# Patient Record
Sex: FEMALE | Race: WHITE | HISPANIC OR LATINO | Employment: FULL TIME | URBAN - METROPOLITAN AREA
[De-identification: names, ages, dates, MRNs, and addresses within clinical notes are randomized per-mention and may not be internally consistent; named-entity substitution may affect disease eponyms.]

---

## 2017-12-08 ENCOUNTER — HOSPITAL ENCOUNTER (OUTPATIENT)
Facility: MEDICAL CENTER | Age: 27
End: 2017-12-08
Payer: COMMERCIAL

## 2017-12-08 LAB
ALBUMIN SERPL BCP-MCNC: 4.4 G/DL (ref 3.2–4.9)
ALBUMIN/GLOB SERPL: 1.5 G/DL
ALP SERPL-CCNC: 68 U/L (ref 30–99)
ALT SERPL-CCNC: 13 U/L (ref 2–50)
ANION GAP SERPL CALC-SCNC: 10 MMOL/L (ref 0–11.9)
AST SERPL-CCNC: 15 U/L (ref 12–45)
BILIRUB SERPL-MCNC: 0.6 MG/DL (ref 0.1–1.5)
BUN SERPL-MCNC: 11 MG/DL (ref 8–22)
CALCIUM SERPL-MCNC: 9.6 MG/DL (ref 8.5–10.5)
CHLORIDE SERPL-SCNC: 104 MMOL/L (ref 96–112)
CHOLEST SERPL-MCNC: 159 MG/DL (ref 100–199)
CO2 SERPL-SCNC: 24 MMOL/L (ref 20–33)
CREAT SERPL-MCNC: 0.63 MG/DL (ref 0.5–1.4)
GFR SERPL CREATININE-BSD FRML MDRD: >60 ML/MIN/1.73 M 2
GLOBULIN SER CALC-MCNC: 3 G/DL (ref 1.9–3.5)
GLUCOSE SERPL-MCNC: 96 MG/DL (ref 65–99)
HDLC SERPL-MCNC: 61 MG/DL
LDLC SERPL CALC-MCNC: 84 MG/DL
POTASSIUM SERPL-SCNC: 3.8 MMOL/L (ref 3.6–5.5)
PROT SERPL-MCNC: 7.4 G/DL (ref 6–8.2)
SODIUM SERPL-SCNC: 138 MMOL/L (ref 135–145)
TRIGL SERPL-MCNC: 72 MG/DL (ref 0–149)

## 2023-08-29 ENCOUNTER — HOSPITAL ENCOUNTER (OUTPATIENT)
Dept: RADIOLOGY | Facility: MEDICAL CENTER | Age: 33
End: 2023-08-29
Attending: PHYSICIAN ASSISTANT
Payer: COMMERCIAL

## 2023-08-29 ENCOUNTER — NON-PROVIDER VISIT (OUTPATIENT)
Dept: URGENT CARE | Facility: PHYSICIAN GROUP | Age: 33
End: 2023-08-29
Payer: COMMERCIAL

## 2023-08-29 ENCOUNTER — OCCUPATIONAL MEDICINE (OUTPATIENT)
Dept: URGENT CARE | Facility: PHYSICIAN GROUP | Age: 33
End: 2023-08-29
Payer: COMMERCIAL

## 2023-08-29 VITALS
SYSTOLIC BLOOD PRESSURE: 114 MMHG | BODY MASS INDEX: 26.31 KG/M2 | HEART RATE: 73 BPM | RESPIRATION RATE: 18 BRPM | DIASTOLIC BLOOD PRESSURE: 62 MMHG | TEMPERATURE: 97.6 F | HEIGHT: 62 IN | OXYGEN SATURATION: 98 % | WEIGHT: 143 LBS

## 2023-08-29 DIAGNOSIS — S66.912A STRAIN OF LEFT WRIST, INITIAL ENCOUNTER: ICD-10-CM

## 2023-08-29 DIAGNOSIS — Z02.1 PRE-EMPLOYMENT DRUG SCREENING: ICD-10-CM

## 2023-08-29 DIAGNOSIS — S60.132A CONTUSION OF LEFT MIDDLE FINGER WITH DAMAGE TO NAIL, INITIAL ENCOUNTER: ICD-10-CM

## 2023-08-29 DIAGNOSIS — Z02.83 ENCOUNTER FOR DRUG SCREENING: Primary | ICD-10-CM

## 2023-08-29 LAB
AMP AMPHETAMINE: NORMAL
BREATH ALCOHOL COMMENT: NORMAL
COC COCAINE: NORMAL
INT CON NEG: NORMAL
INT CON POS: NORMAL
MET METHAMPHETAMINES: NORMAL
OPI OPIATES: NORMAL
PCP PHENCYCLIDINE: NORMAL
POC BREATHALIZER: 0 PERCENT (ref 0–0.01)
POC DRUG COMMENT 753798-OCCUPATIONAL HEALTH: NEGATIVE
THC: NORMAL

## 2023-08-29 PROCEDURE — 82075 ASSAY OF BREATH ETHANOL: CPT | Performed by: PHYSICIAN ASSISTANT

## 2023-08-29 PROCEDURE — 73140 X-RAY EXAM OF FINGER(S): CPT | Mod: LT

## 2023-08-29 PROCEDURE — 73110 X-RAY EXAM OF WRIST: CPT | Mod: LT

## 2023-08-29 PROCEDURE — 99203 OFFICE O/P NEW LOW 30 MIN: CPT | Performed by: PHYSICIAN ASSISTANT

## 2023-08-29 PROCEDURE — 3078F DIAST BP <80 MM HG: CPT | Performed by: PHYSICIAN ASSISTANT

## 2023-08-29 PROCEDURE — 3074F SYST BP LT 130 MM HG: CPT | Performed by: PHYSICIAN ASSISTANT

## 2023-08-29 PROCEDURE — 80305 DRUG TEST PRSMV DIR OPT OBS: CPT | Performed by: PHYSICIAN ASSISTANT

## 2023-08-29 ASSESSMENT — ENCOUNTER SYMPTOMS
WEAKNESS: 0
TINGLING: 0
MYALGIAS: 1

## 2023-08-29 NOTE — LETTER
[facility] [place] [address] [place] [address]  Phone:  [phone] - Fax:  [phone]   [facility] Progress Report and Disability Certification  Date of Service: 8/29/2023   No Show:  No  Date / Time of Next Visit: 9/5/2023   Claim Information   Patient Name: Angela Mackey  Claim Number:     Employer: [employer]  Date of Injury: 8/28/2023     Insurer / TPA: Zhen Salguero  ID / SSN:     Occupation: PRODUCTION  Diagnosis: Diagnoses of Strain of left wrist, initial encounter and Contusion of left middle finger with damage to nail, initial encounter were pertinent to this visit.    Medical Information   Related to Industrial Injury?      Subjective Complaints:  PI:  DOI: 8/28/2023  DANIEL:  This is a very pleasant 33-year-old female present to the clinic after sustaining a work-related injury.  The patient works at [employer]  She was using a staple gun yesterday that was connected to an air compressor.  States the gun recoiled causing her to break her acrylic nail of her left middle finger.  Also caused her to bend her left wrist back awkwardly.  Experiencing pain over the dorsal aspect of the left wrist.  Maintains full range of motion.  Has not noted any swelling or discoloration.  No numbness tingling distally.  Iced yesterday without any significant improvement.  No OTC meds have been started.  She is right-hand dominant.  No prior injury.  No secondary employment.   Objective Findings: Constitutional: Pt is oriented to person, place, and time.  Appears well-developed and well-nourished. No distress.   Eyes: Conjunctivae are normal.   Cardiovascular: Normal rate.    Pulmonary/Chest: Effort normal.   Musculoskeletal: Left hand and wrist: Finger has a broken acrylic nail.  No damage to the nailbed.  No swelling or discoloration present.  Patient maintains full range of motion MCP PIP and DIP joint.  No bony tenderness.  Has tenderness to palpation diffusely over  the dorsal aspect of the left wrist.  Patient has full wrist range of motion.   strength 5/5.  Neurological: Pt is alert and oriented to person, place, and time. Coordination normal.   Skin: Skin is warm. Pt is not diaphoretic. No erythema.   Psychiatric: Pt has a normal mood and affect.  Behavior is normal.      Pre-Existing Condition(s):     Assessment:   Initial Visit    Status: Additional Care Required  Permanent Disability:     Plan:      Diagnostics: X-ray    Comments:  X-rays reviewed without any fracture or bony abnormality of the left middle finger or wrist    Disability Information   Status: Released to Restricted Duty    From:  8/29/2023  Through: 9/5/2023 Restrictions are: Temporary   Physical Restrictions   Sitting:    Standing:    Stooping:    Bending:      Squatting:    Walking:    Climbing:    Pushing:      Pulling:    Other:    Reaching Above Shoulder (L):   Reaching Above Shoulder (R):       Reaching Below Shoulder (L):    Reaching Below Shoulder (R):      Not to exceed Weight Limits   Carrying(hrs):   Weight Limit(lb): < or = to 10 pounds Lifting(hrs):   Weight  Limit(lb): < or = to 10 pounds   Comments: Patient released to return to restricted duty.  X-rays reviewed without any fracture or bony abnormality.  No lifting greater than 10 pounds with left arm.    Advised ice elevation and anti-inflammatory use to decrease pain and swelling.  Gentle range of motion and stretching exercises encouraged for the left wrist.  Follow-up in clinic on 9/5/2023    Repetitive Actions   Hands: i.e. Fine Manipulations from Grasping:     Feet: i.e. Operating Foot Controls:     Driving / Operate Machinery:     Health Care Provider’s Original or Electronic Signature  Kenneth Power P.A.-C. Health Care Provider’s Original or Electronic Signature    Jerry Ramires DO MPH     Clinic Name / Location: [facility] [address] [address] Clinic Phone Number: Dept: [phone]   Appointment  Time: 8:30 Am Visit Start Time: 9:17 AM   Check-In Time:  9:00 Am Visit Discharge Time:    Original-Treating Physician or Chiropractor    Page 2-Insurer/TPA    Page 3-Employer    Page 4-Employee

## 2023-08-29 NOTE — PROGRESS NOTES
"Subjective     Angela Mackey is a 33 y.o. female who presents with Arm Injury (Workers Comp new staple gun recoiled injuring her Lft arm, wrist, finger pain)      PI:  DOI: 8/28/2023  DANIEL:  This is a very pleasant 33-year-old female present to the clinic after sustaining a work-related injury.  The patient works at [employer]  She was using a staple gun yesterday that was connected to an air compressor.  States the gun recoiled causing her to break her acrylic nail of her left middle finger.  Also caused her to bend her left wrist back awkwardly.  Experiencing pain over the dorsal aspect of the left wrist.  Maintains full range of motion.  Has not noted any swelling or discoloration.  No numbness tingling distally.  Iced yesterday without any significant improvement.  No OTC meds have been started.  She is right-hand dominant.  No prior injury.  No secondary employment.       Review of Systems   Musculoskeletal:  Positive for joint pain and myalgias.   Neurological:  Negative for tingling and weakness.          PMH:   No pertinent past medical history to this problem  MEDS:  Medications were reviewed in EMR  ALLERGIES:  Allergies were reviewed in EMR  FH:   No pertinent family history to this problem      Objective     /62 (BP Location: Left arm, Patient Position: Sitting, BP Cuff Size: Adult)   Pulse 73   Temp 36.4 °C (97.6 °F) (Temporal)   Resp 18   Ht 1.575 m (5' 2\")   Wt 64.9 kg (143 lb)   SpO2 98%   BMI 26.16 kg/m²      Physical Exam    Constitutional: Pt is oriented to person, place, and time.  Appears well-developed and well-nourished. No distress.   Eyes: Conjunctivae are normal.   Cardiovascular: Normal rate.    Pulmonary/Chest: Effort normal.   Musculoskeletal: Left hand and wrist: Finger has a broken acrylic nail.  No damage to the nailbed.  No swelling or discoloration present.  Patient maintains full range of motion MCP PIP and DIP joint.  No bony tenderness.  Has tenderness to palpation " diffusely over the dorsal aspect of the left wrist.  Patient has full wrist range of motion.   strength 5/5.  Neurological: Pt is alert and oriented to person, place, and time. Coordination normal.   Skin: Skin is warm. Pt is not diaphoretic. No erythema.   Psychiatric: Pt has a normal mood and affect.  Behavior is normal.        RADIOLOGY RESULTS   DX-WRIST-COMPLETE 3+ LEFT    Result Date: 8/29/2023 8/29/2023 10:11 AM HISTORY/REASON FOR EXAM:  Pain/Deformity Following Trauma. Left wrist pain, injury TECHNIQUE/EXAM DESCRIPTION AND NUMBER OF VIEWS:  4 views of the LEFT wrist. COMPARISON: Left 3rd finger series 8/29/2023 FINDINGS: There is no fracture. Alignment is normal. No degenerative changes are present.     Negative left wrist series    DX-FINGER(S) 2+ LEFT    Result Date: 8/29/2023 8/29/2023 10:11 AM HISTORY/REASON FOR EXAM:  Pain/Deformity Following Trauma. Left 3rd finger pain, injury TECHNIQUE/EXAM DESCRIPTION AND NUMBER OF VIEWS:  3 views of the LEFT fingers. COMPARISON: None FINDINGS: There is no fracture. Alignment is normal. No degenerative changes are present.     Negative frontal view of the left hand and 3rd finger series                Assessment & Plan        1. Strain of left wrist, initial encounter  - DX-WRIST-COMPLETE 3+ LEFT; Future    2. Contusion of left middle finger with damage to nail, initial encounter  - DX-FINGER(S) 2+ LEFT; Future       atient released to return to restricted duty.  X-rays reviewed without any fracture or bony abnormality.  No lifting greater than 10 pounds with left arm.    Advised ice elevation and anti-inflammatory use to decrease pain and swelling.  Gentle range of motion and stretching exercises encouraged for the left wrist.  Follow-up in clinic on 9/5/2023    Differential diagnosis, natural history, supportive care, and indications for immediate follow-up discussed at length.

## 2023-08-29 NOTE — LETTER
"EMPLOYEE’S CLAIM FOR COMPENSATION/ REPORT OF INITIAL TREATMENT  FORM C-4  PLEASE TYPE OR PRINT    EMPLOYEE’S CLAIM - PROVIDE ALL INFORMATION REQUESTED   First Name  Angela Last Name  Narendra Birthdate                    1990                Sex  female Claim Number (Insurer’s Use Only)   Home Address  [address] [place] Age  33 y.o. Height  1.575 m (5' 2\") Weight  64.9 kg (143 lb) [facility]     [facility] Zip  [address] Telephone  [phone] (home) [phone] (work)   Mailing Address  [address] [facility] [address] [place] Zip  [address] Primary Language Spoken  English    INSURER  Zhen Salguero THIRD-PARTY     Zhen Salguero   Employee's Occupation (Job Title) When Injury or Occupational Disease Occurred  PRODUCTION    Employer's Name/Company Name  [employer]  Telephone  [phone]    Office Mail Address (Number and Street)  [address]        Date of Injury  8/28/2023               Hours Injury  5:30 AM Date Employer Notified  8/28/2023 Last Day of Work after Injury or Occupational Disease  8/28/2023 Supervisor to Whom Injury     Reported  GRACIE Y PIERO   Address or Location of Accident (if applicable)  Work [1]   What were you doing at the time of accident? (if applicable)  ASIENDO PAGE    How did this injury or occupational disease occur? (Be specific and answer in detail. Use additional sheet if necessary)  MIENTRAS ESTABA ASIENDO PAGE SE ME MOVIO LAS PISTOLA DE LAS GRAPAS Y ME MACHUCO MI DEDO Y CON LA PRECION DE LA MAQUINA ME GOLPIO LA ANNA DE MI MANO   If you believe that you have an occupational disease, when did you first have knowledge of the disability and its relationship to your employment?  N/A Witnesses to the Accident (if applicable)  N/A      Nature of Injury or Occupational Disease  Workers' Compensation  Part(s) of Body Injured or Affected  Wrist (L) and Hand (L), Finger " (L), N/A    I CERTIFY THAT THE ABOVE IS TRUE AND CORRECT TO T HE BEST OF MY KNOWLEDGE AND THAT I HAVE PROVIDED THIS INFORMATION IN ORDER TO OBTAIN THE BENEFITS OF [place], OR [place]).  I HEREBY AUTHORIZE ANY PHYSICIAN, CHIROPRACTOR, SURGEON, PRACTITIONER OR ANY OTHER PERSON, ANY HOSPITAL, INCLUDING [facility] OR [facility], ANY  MEDICAL SERVICE ORGANIZATION, ANY INSURANCE COMPANY, OR OTHER INSTITUTION OR ORGANIZATION TO RELEASE TO EACH OTHER, ANY MEDICAL OR OTHER INFORMATION, INCLUDING BENEFITS PAID OR PAYABLE, PERTINENT TO THIS INJURY OR DISEASE, EXCEPT INFORMATION RELATIVE TO DIAGNOSIS, TREATMENT AND/OR COUNSELING FOR AIDS, PSYCHOLOGICAL CONDITIONS, ALCOHOL OR CONTROLLED SUBSTANCES, FOR WHICH I MUST GIVE SPECIFIC AUTHORIZATION.  A PHOTOSTAT OF THIS AUTHORIZATION SHALL BE VALID AS THE ORIGINAL.       Date   Place Employee’s Original or  *Electronic Signature   THIS REPORT MUST BE COMPLETED AND MAILED WITHIN 3 WORKING DAYS OF TREATMENT   Place  [facility] [place] [facility]  Name of Facility  [facility]   Date  8/29/2023 Diagnosis and Description of Injury or Occupational Disease  (S66.912A) Strain of left wrist, initial encounter  (S60.132A) Contusion of left middle finger with damage to nail, initial encounter Is there evidence that the injured employee was under the influence of alcohol and/or another controlled substance at the time of accident?  ? No ? Yes (if yes, please explain)   Hour  9:17 AM   Diagnoses of Strain of left wrist, initial encounter and Contusion of left middle finger with damage to nail, initial encounter were pertinent to this visit. No   Treatment  atient released to return to restricted duty.  X-rays reviewed without any fracture or bony abnormality.  No lifting greater than 10 pounds with left arm.    Advised ice elevation and anti-inflammatory use to decrease pain and  swelling.  Gentle range of motion and stretching exercises encouraged for the left wrist.  Follow-up in clinic on 9/5/2023    Have you advised the patient to remain off work five days or     more?    X-Ray Findings  Negative   ? Yes Indicate dates:   From   To      From information given by the employee, together with medical evidence, can        you directly connect this injury or occupational disease as job incurred?  Yes ? No If no, is the injured employee capable of:  ? full duty  No ? modified duty  Yes   Is additional medical care by a physician indicated?  Yes If modified duty, specify any limitations / restrictions  See D-39   Do you know of any previous injury or disease contributing to this condition or occupational disease?  ? Yes ? No (Explain if yes)                          No   Date  8/29/2023 Print Health Care Provider's  Name  Zurdo Power P.A.-C. I certify that the employer’s copy of  this form was delivered to the employer on:   Address  [address] Insurer’s Use Only     [place] [facility] Zip  [address]    Provider’s Tax ID Number  [id number] Telephone  Dept: [phone]             Health Care Provider’s Original or Electronic Signature  e-ZURDO Cordova P.A.-C. Degree (MD,DO, DC,PAAlethaC,APRN)  DERIAN      * Complete and attach Release of Information (Form C-4A) when injured employee signs C-4 Form electronically  ORIGINAL - TREATING HEALTHCARE PROVIDER PAGE 2 - INSURER/TPA PAGE 3 - EMPLOYER PAGE 4 - EMPLOYEE             Form C-4 (rev.08/21)           BRIEF DESCRIPTION OF RIGHTS AND BENEFITS  (Pursuant to [place] 616C.050)    Notice of Injury or Occupational Disease (Incident Report Form C-1): If an injury or occupational disease (OD) arises out of and in the course of employment, you must provide written notice to your employer as soon as practicable, but no later than 7 days after the accident or OD. Your employer shall maintain a sufficient supply of the required forms.    Claim for  "Compensation (Form C-4): If medical treatment is sought, the form C-4 is available at the place of initial treatment. A completed \"Claim for Compensation\" (Form C-4) must be filed within 90 days after an accident or OD. The treating physician or chiropractor must, within 3 working days after treatment, complete and mail to the employer, the employer's insurer and third-party , the Claim for Compensation.    Medical Treatment: If you require medical treatment for your on-the-job injury or OD, you may be required to select a physician or chiropractor from a list provided by your workers’ compensation insurer, if it has contracted with an Organization for Managed Care (MCO) or Preferred Provider Organization (PPO) or providers of health care. If your employer has not entered into a contract with an MCO or PPO, you may select a physician or chiropractor from the Panel of Physicians and Chiropractors. Any medical costs related to your industrial injury or OD will be paid by your insurer.    Temporary Total Disability (TTD): If your doctor has certified that you are unable to work for a period of at least 5 consecutive days, or 5 cumulative days in a 20-day period, or places restrictions on you that your employer does not accommodate, you may be entitled to TTD compensation.    Temporary Partial Disability (TPD): If the wage you receive upon reemployment is less than the compensation for TTD to which you are entitled, the insurer may be required to pay you TPD compensation to make up the difference. TPD can only be paid for a maximum of 24 months.    Permanent Partial Disability (PPD): When your medical condition is stable and there is an indication of a PPD as a result of your injury or OD, within 30 days, your insurer must arrange for an evaluation by a rating physician or chiropractor to determine the degree of your PPD. The amount of your PPD award depends on the date of injury, the results of the PPD " evaluation, your age and wage.    Permanent Total Disability (PTD): If you are medically certified by a treating physician or chiropractor as permanently and totally disabled and have been granted a PTD status by your insurer, you are entitled to receive monthly benefits not to exceed 66 2/3% of your average monthly wage. The amount of your PTD payments is subject to reduction if you previously received a lump-sum PPD award.    Vocational Rehabilitation Services: You may be eligible for vocational rehabilitation services if you are unable to return to the job due to a permanent physical impairment or permanent restrictions as a result of your injury or occupational disease.    Transportation and Per Justice Reimbursement: You may be eligible for travel expenses and per justice associated with medical treatment.    Reopening: You may be able to reopen your claim if your condition worsens after claim closure.     Appeal Process: If you disagree with a written determination issued by the insurer or the insurer does not respond to your request, you may appeal to the Department of Administration, , by following the instructions contained in your determination letter. You must appeal the determination within 70 days from the date of the determination letter at [address], [address], [place], [place] [address], or [address], [address], [place] [address]. If you disagree with the  decision, you may appeal to the Department of Administration, . You must file your appeal within 30 days from the date of the  decision letter at [address], [address], [place], [place] [address], or [address], [address], [place] [address]. If you disagree with a decision of an , you may file a petition for judicial review with the District Court. You must do so within 30 days of the Appeal Officer’s decision. You may be  represented by an  at your own expense or you may contact the [facility] for possible representation.    [place]  for Injured Workers (NAIW): If you disagree with a  decision, you may request that NAIW represent you without charge at an  Hearing. For information regarding denial of benefits, you may contact the [facility] at: [address], [address], [place] [address], [phone], or [address], [address], [place] [address], [phone]    To File a Complaint with the Division: If you wish to file a complaint with the  of the Division of Industrial Relations (DIR),  please contact the Workers’ Compensation Section, [address], [address], [place], [place] [address], telephone ([phone]) [phone], or [address], [address], [place], [place] [address], telephone [phone].    For assistance with Workers’ Compensation Issues: You may contact the [facility] Office for Consumer Health Assistance, [address], [address], [place], [place] [address], Toll Free 1-449.503.4721, Web site: http://[facility].[place].gov/Programs/MILTON E-mail: [email or web address]              __________________________________________________________________                                    _________________            Employee Name / Signature                                                                                                                            Date                                                                                                                                                                                                                              D-2 (rev. 10/20)

## 2023-09-05 ENCOUNTER — OCCUPATIONAL MEDICINE (OUTPATIENT)
Dept: URGENT CARE | Facility: PHYSICIAN GROUP | Age: 33
End: 2023-09-05
Payer: COMMERCIAL

## 2023-09-05 VITALS
RESPIRATION RATE: 12 BRPM | BODY MASS INDEX: 26.31 KG/M2 | SYSTOLIC BLOOD PRESSURE: 104 MMHG | HEIGHT: 62 IN | HEART RATE: 75 BPM | WEIGHT: 143 LBS | OXYGEN SATURATION: 100 % | DIASTOLIC BLOOD PRESSURE: 62 MMHG | TEMPERATURE: 97.4 F

## 2023-09-05 DIAGNOSIS — S60.032D CONTUSION OF LEFT MIDDLE FINGER WITHOUT DAMAGE TO NAIL, SUBSEQUENT ENCOUNTER: ICD-10-CM

## 2023-09-05 DIAGNOSIS — S66.912D STRAIN OF LEFT WRIST, SUBSEQUENT ENCOUNTER: ICD-10-CM

## 2023-09-05 PROCEDURE — 3074F SYST BP LT 130 MM HG: CPT | Performed by: NURSE PRACTITIONER

## 2023-09-05 PROCEDURE — 99213 OFFICE O/P EST LOW 20 MIN: CPT | Performed by: NURSE PRACTITIONER

## 2023-09-05 PROCEDURE — 3078F DIAST BP <80 MM HG: CPT | Performed by: NURSE PRACTITIONER

## 2023-09-05 NOTE — LETTER
[facility]  [address] [facility] [place] [address]  Phone:  [phone] - Fax:  [phone]   Occupational Health Network Progress Report and Disability Certification  Date of Service: 9/5/2023   No Show:  No  Date / Time of Next Visit: 9/12/2023   Claim Information   Patient Name: Angela Mackey  Claim Number:     Employer: [employer]  Date of Injury: 8/28/2023     Insurer / TPA: Zhen Salguero  ID / SSN:     Occupation: PRODUCTION  Diagnosis: There were no encounter diagnoses.    Medical Information   Related to Industrial Injury? Yes    Subjective Complaints:  DOI: 8/28/2023  (Copied from initial visit)  DANIEL:  This is a very pleasant 33-year-old female present to the clinic after sustaining a work-related injury.  The patient works at [employer]  She was using a staple gun yesterday that was connected to an air compressor.  States the gun recoiled causing her to break her acrylic nail of her left middle finger.  Also caused her to bend her left wrist back awkwardly.  Experiencing pain over the dorsal aspect of the left wrist.  Maintains full range of motion.  Has not noted any swelling or discoloration.  No numbness tingling distally.  Iced yesterday without any significant improvement.  No OTC meds have been started.  She is right-hand dominant.  No prior injury.  No secondary employment.     F/V #1 9/5/2023:  Patient reports that she is about 50% improved since her initial injury. She is able to work with the work restrictions provided.  She states that she does have some pain while working and then again when she is done working. She reports the pain is mostly to the dorsal aspect of the wrist and a mild pain in the distal middle finger.  She is using the brace when she gets pain and is also using ice and elevation.  She denies any numbness or tingling.       Objective Findings: Physical Exam  Constitutional: Pt is oriented to person, place, and time.  Appears well-developed and  well-nourished. No distress.   Eyes: Conjunctivae are normal.   Cardiovascular: Normal rate.    Pulmonary/Chest: Effort normal.   Musculoskeletal:  Left wrist with mild tenderness to palpation over the dorsal aspect of the left wrist. Mild tenderness over the distal tip of the middle finger.  No swelling noted. No bruising noted.  ROM is full without pain.   strength 5/5.  Neurological: Pt is alert and oriented to person, place, and time. Coordination normal.   Skin: Skin is warm. Pt is not diaphoretic. No erythema.   Psychiatric: Pt has a normal mood and affect.  Behavior is normal.    Pre-Existing Condition(s):     Assessment:   Condition Improved    Status: Additional Care Required  Permanent Disability:No    Plan:      Diagnostics:      Comments:       Disability Information   Status: Released to Restricted Duty    From:  9/5/2023  Through: 9/12/2023 Restrictions are:     Physical Restrictions   Sitting:    Standing:    Stooping:    Bending:      Squatting:    Walking:    Climbing:    Pushing:      Pulling:    Other:    Reaching Above Shoulder (L):   Reaching Above Shoulder (R):       Reaching Below Shoulder (L):    Reaching Below Shoulder (R):      Not to exceed Weight Limits   Carrying(hrs):   Weight Limit(lb): < or = to 10 pounds Lifting(hrs):   Weight  Limit(lb): < or = to 10 pounds   Comments: 1.  Left wrist strain  2.  Left middle finger contusion  Restrictions per D39  Continue use of brace, ice, elevation and antiinflammatories as needed for pain  Return in one week for reevaluation, sooner if worse    Repetitive Actions   Hands: i.e. Fine Manipulations from Grasping:     Feet: i.e. Operating Foot Controls:     Driving / Operate Machinery:     Health Care Provider’s Original or Electronic Signature  SCOTT Cross. Health Care Provider’s Original or Electronic Signature    Jerry Ramires DO MPH     Clinic Name / Location: [facility] [address] [address]  Clinic Phone Number: Dept: [phone]   Appointment Time: 1:00 Pm Visit Start Time: 2:19 PM   Check-In Time:  12:56 Pm Visit Discharge Time:  3:00 PM   Original-Treating Physician or Chiropractor    Page 2-Insurer/TPA    Page 3-Employer    Page 4-Employee

## 2023-09-05 NOTE — PROGRESS NOTES
"Subjective:     Angela Mackey is a 33 y.o. female who presents for Other (W/C F/U: L hand middle finger and hand. Finger does not hurt anymore but wrist pain comes and goes. )      DOI: 8/28/2023  (Copied from initial visit)  DANIEL:  This is a very pleasant 33-year-old female present to the clinic after sustaining a work-related injury.  The patient works at [employer]  She was using a staple gun yesterday that was connected to an air compressor.  States the gun recoiled causing her to break her acrylic nail of her left middle finger.  Also caused her to bend her left wrist back awkwardly.  Experiencing pain over the dorsal aspect of the left wrist.  Maintains full range of motion.  Has not noted any swelling or discoloration.  No numbness tingling distally.  Iced yesterday without any significant improvement.  No OTC meds have been started.  She is right-hand dominant.  No prior injury.  No secondary employment.     F/V #1 9/5/2023:  Patient reports that she is about 50% improved since her initial injury. She is able to work with the work restrictions provided.  She states that she does have some pain while working and then again when she is done working. She reports the pain is mostly to the dorsal aspect of the wrist and a mild pain in the distal middle finger.  She is using the brace when she gets pain and is also using ice and elevation.  She denies any numbness or tingling.        PMH:   No pertinent past medical history to this problem  MEDS:  Medications were reviewed in EMR  ALLERGIES:  Allergies were reviewed in EMR  SOCH[employer]:  Works as a   FH:   No pertinent family history to this problem       Objective:     /62   Pulse 75   Temp 36.3 °C (97.4 °F)   Resp 12   Ht 1.575 m (5' 2\")   Wt 64.9 kg (143 lb)   SpO2 100%   BMI 26.16 kg/m²     Physical Exam  Constitutional: Pt is oriented to person, place, and time.  Appears well-developed and well-nourished. No distress.   Eyes: " Conjunctivae are normal.   Cardiovascular: Normal rate.    Pulmonary/Chest: Effort normal.   Musculoskeletal:  Left wrist with mild tenderness to palpation over the dorsal aspect of the left wrist. Mild tenderness over the distal tip of the middle finger.  No swelling noted. No bruising noted.  ROM is full without pain.   strength 5/5.  Neurological: Pt is alert and oriented to person, place, and time. Coordination normal.   Skin: Skin is warm. Pt is not diaphoretic. No erythema.   Psychiatric: Pt has a normal mood and affect.  Behavior is normal.     Assessment/Plan:       1. Strain of left wrist, subsequent encounter    2. Contusion of left middle finger without damage to nail, subsequent encounter    Released to Restricted Duty FROM 9/5/2023 TO 9/12/2023  1.  Left wrist strain  2.  Left middle finger contusion  Restrictions per D39  Continue use of brace, ice, elevation and antiinflammatories as needed for pain  Return in one week for reevaluation, sooner if worse       Differential diagnosis, natural history, supportive care, and indications for immediate follow-up discussed.

## 2023-09-12 ENCOUNTER — OCCUPATIONAL MEDICINE (OUTPATIENT)
Dept: URGENT CARE | Facility: PHYSICIAN GROUP | Age: 33
End: 2023-09-12
Payer: COMMERCIAL

## 2023-09-12 VITALS
DIASTOLIC BLOOD PRESSURE: 68 MMHG | OXYGEN SATURATION: 98 % | WEIGHT: 145 LBS | HEART RATE: 76 BPM | BODY MASS INDEX: 26.68 KG/M2 | SYSTOLIC BLOOD PRESSURE: 106 MMHG | RESPIRATION RATE: 18 BRPM | TEMPERATURE: 97.8 F | HEIGHT: 62 IN

## 2023-09-12 DIAGNOSIS — S66.912D STRAIN OF LEFT WRIST, SUBSEQUENT ENCOUNTER: ICD-10-CM

## 2023-09-12 DIAGNOSIS — S60.032D CONTUSION OF LEFT MIDDLE FINGER WITHOUT DAMAGE TO NAIL, SUBSEQUENT ENCOUNTER: ICD-10-CM

## 2023-09-12 PROCEDURE — 3074F SYST BP LT 130 MM HG: CPT | Performed by: STUDENT IN AN ORGANIZED HEALTH CARE EDUCATION/TRAINING PROGRAM

## 2023-09-12 PROCEDURE — 3078F DIAST BP <80 MM HG: CPT | Performed by: STUDENT IN AN ORGANIZED HEALTH CARE EDUCATION/TRAINING PROGRAM

## 2023-09-12 PROCEDURE — 99213 OFFICE O/P EST LOW 20 MIN: CPT | Performed by: STUDENT IN AN ORGANIZED HEALTH CARE EDUCATION/TRAINING PROGRAM

## 2023-09-12 NOTE — LETTER
[facility]  [address] [facility] [place] [address]  Phone:  [phone] - Fax:  [phone]   [facility] Progress Report and Disability Certification  Date of Service: 9/12/2023   No Show:  No  Date / Time of Next Visit:  Discharge/MMI   Claim Information   Patient Name: Angela Mackey  Claim Number:     Employer: [employer]  Date of Injury: 8/28/2023     Insurer / TPA: Zhen Salguero  ID / SSN:     Occupation: PRODUCTION  Diagnosis: Diagnoses of Strain of left wrist, subsequent encounter and Contusion of left middle finger without damage to nail, subsequent encounter were pertinent to this visit.    Medical Information   Related to Industrial Injury? Yes    Subjective Complaints:  DOI: 8/28/2023  (Copied from initial visit)  DANIEL:  This is a very pleasant 33-year-old female present to the clinic after sustaining a work-related injury.  The patient works at [employer]  She was using a staple gun yesterday that was connected to an air compressor.  States the gun recoiled causing her to break her acrylic nail of her left middle finger.  Also caused her to bend her left wrist back awkwardly.  Experiencing pain over the dorsal aspect of the left wrist.  Maintains full range of motion.  Has not noted any swelling or discoloration.  No numbness tingling distally.  Iced yesterday without any significant improvement.  No OTC meds have been started.  She is right-hand dominant.  No prior injury.  No secondary employment.      F/V #1 9/5/2023:  Patient reports that she is about 50% improved since her initial injury. She is able to work with the work restrictions provided.  She states that she does have some pain while working and then again when she is done working. She reports the pain is mostly to the dorsal aspect of the wrist and a mild pain in the distal middle finger.  She is using the brace when she gets pain and is also using ice and elevation.  She denies any numbness  or tingling.     Today's date: 9/12/2023.  An  was used aid with the encounter  Patient reports she is 100% better.  No longer experiencing pain.  No additional complaints or concerns.   Objective Findings: Gen: no acute distress, normal voice  Skin: dry, intact, moist mucosal membranes  Head: Atraumatic, normocephalic  Psych: normal affect, normal judgement, alert, awake  Musculoskeletal: Left wrist: No erythema, ecchymosis or edema.  Full range of motion in all planes without any discernible discomfort.  No focal tenderness to palpation.  No motor or sensory deficits.       Pre-Existing Condition(s):     Assessment:   Condition Improved    Status: Discharged /  MMI  Permanent Disability:No    Plan:      Diagnostics:      Comments:       Disability Information   Status: Released to Full Duty    From:     Through:   Restrictions are:     Physical Restrictions   Sitting:    Standing:    Stooping:    Bending:      Squatting:    Walking:    Climbing:    Pushing:      Pulling:    Other:    Reaching Above Shoulder (L):   Reaching Above Shoulder (R):       Reaching Below Shoulder (L):    Reaching Below Shoulder (R):      Not to exceed Weight Limits   Carrying(hrs):   Weight Limit(lb):   Lifting(hrs):   Weight  Limit(lb):     Comments: Discharge/MMI    Repetitive Actions   Hands: i.e. Fine Manipulations from Grasping:     Feet: i.e. Operating Foot Controls:     Driving / Operate Machinery:     Health Care Provider’s Original or Electronic Signature  Padilla Wilhelm D.O. Health Care Provider’s Original or Electronic Signature    Jerry Ramires DO MPH     Clinic Name / Location: [address] [address] Clinic Phone Number: Dept: [phone]   Appointment Time: 12:45 Pm Visit Start Time: 12:59 PM   Check-In Time:  12:39 Pm Visit Discharge Time: 1:55 PM   Original-Treating Physician or Chiropractor    Page 2-Insurer/TPA    Page 3-Employer    Page 4-Employee

## 2023-09-12 NOTE — PROGRESS NOTES
"Subjective:     Angela Mackey is a 33 y.o. female who presents for Hand Injury (WC FV Staple in finger)      DOI: 8/28/2023  (Copied from initial visit)  DANIEL:  This is a very pleasant 33-year-old female present to the clinic after sustaining a work-related injury.  The patient works at [employer]  She was using a staple gun yesterday that was connected to an air compressor.  States the gun recoiled causing her to break her acrylic nail of her left middle finger.  Also caused her to bend her left wrist back awkwardly.  Experiencing pain over the dorsal aspect of the left wrist.  Maintains full range of motion.  Has not noted any swelling or discoloration.  No numbness tingling distally.  Iced yesterday without any significant improvement.  No OTC meds have been started.  She is right-hand dominant.  No prior injury.  No secondary employment.      F/V #1 9/5/2023:  Patient reports that she is about 50% improved since her initial injury. She is able to work with the work restrictions provided.  She states that she does have some pain while working and then again when she is done working. She reports the pain is mostly to the dorsal aspect of the wrist and a mild pain in the distal middle finger.  She is using the brace when she gets pain and is also using ice and elevation.  She denies any numbness or tingling.     Today's date: 9/12/2023.  An  was used aid with the encounter  Patient reports she is 100% better.  No longer experiencing pain.  No additional complaints or concerns.    PMH:   No pertinent past medical history to this problem  MEDS:  Medications were reviewed in EMR  ALLERGIES:  Allergies were reviewed in EMR  FH:   No pertinent family history to this problem       Objective:     /68   Pulse 76   Temp 36.6 °C (97.8 °F)   Resp 18   Ht 1.575 m (5' 2\")   Wt 65.8 kg (145 lb)   SpO2 98%   BMI 26.52 kg/m²     Gen: no acute distress, normal voice  Skin: dry, intact, moist mucosal " membranes  Head: Atraumatic, normocephalic  Psych: normal affect, normal judgement, alert, awake  Musculoskeletal: Left wrist: No erythema, ecchymosis or edema.  Full range of motion in all planes without any discernible discomfort.  No focal tenderness to palpation.  No motor or sensory deficits.        Assessment/Plan:       1. Strain of left wrist, subsequent encounter    2. Contusion of left middle finger without damage to nail, subsequent encounter    Released to Full Duty FROM   TO    Discharge/MMI       Differential diagnosis, natural history, supportive care, and indications for immediate follow-up discussed.

## 2023-09-14 ENCOUNTER — OCCUPATIONAL MEDICINE (OUTPATIENT)
Dept: URGENT CARE | Facility: PHYSICIAN GROUP | Age: 33
End: 2023-09-14
Payer: COMMERCIAL

## 2023-09-14 VITALS
BODY MASS INDEX: 26.68 KG/M2 | RESPIRATION RATE: 14 BRPM | HEART RATE: 68 BPM | WEIGHT: 145 LBS | HEIGHT: 62 IN | TEMPERATURE: 97.6 F | SYSTOLIC BLOOD PRESSURE: 100 MMHG | DIASTOLIC BLOOD PRESSURE: 80 MMHG | OXYGEN SATURATION: 99 %

## 2023-09-14 DIAGNOSIS — S60.132D CONTUSION OF LEFT MIDDLE FINGER WITH DAMAGE TO NAIL, SUBSEQUENT ENCOUNTER: ICD-10-CM

## 2023-09-14 DIAGNOSIS — S66.912D STRAIN OF LEFT WRIST, SUBSEQUENT ENCOUNTER: ICD-10-CM

## 2023-09-14 PROCEDURE — 3079F DIAST BP 80-89 MM HG: CPT | Performed by: NURSE PRACTITIONER

## 2023-09-14 PROCEDURE — 3074F SYST BP LT 130 MM HG: CPT | Performed by: NURSE PRACTITIONER

## 2023-09-14 PROCEDURE — 99213 OFFICE O/P EST LOW 20 MIN: CPT | Performed by: NURSE PRACTITIONER

## 2023-09-14 ASSESSMENT — ENCOUNTER SYMPTOMS: TINGLING: 0

## 2023-09-14 NOTE — PROGRESS NOTES
"Subjective:   Angela Mackey  is a 33 y.o. female who presents for Follow-Up (WC, Lt hand injury.)    DOI: 8/28/2023  (Copied from initial visit)  Patient Kyrgyz-speaking  used for encounter.  \"This is a very pleasant 33-year-old female present to the clinic after sustaining a work-related injury.  The patient works at [employer]  She was using a staple gun yesterday that was connected to an air compressor.  States the gun recoiled causing her to break her acrylic nail of her left middle finger.  Also caused her to bend her left wrist back awkwardly.  Experiencing pain over the dorsal aspect of the left wrist. \"  Patient returns the urgent care for their follow-up visit having exacerbating pain.  Last visit she had improvement however back to work having to use her left wrist increasing pain.  She does wear wrist brace as needed.  Continues to have pain at the left wrist with certain motions.  Minimal pain at the fingernail.  Denies any numbness or tingling in the left hand.  Patient requesting change in work restrictions   HPI  Review of Systems   Musculoskeletal:  Positive for joint pain.   Neurological:  Negative for tingling.     No Known Allergies   Objective:   /80   Pulse 68   Temp 36.4 °C (97.6 °F) (Temporal)   Resp 14   Ht 1.575 m (5' 2\")   Wt 65.8 kg (145 lb)   SpO2 99%   BMI 26.52 kg/m²   Physical Exam  Constitutional:       Appearance: Normal appearance. She is not ill-appearing or toxic-appearing.   HENT:      Head: Normocephalic.      Right Ear: External ear normal.      Left Ear: External ear normal.      Nose: Nose normal.      Mouth/Throat:      Lips: Pink.   Eyes:      General: Lids are normal.   Pulmonary:      Effort: Pulmonary effort is normal. No accessory muscle usage.   Musculoskeletal:      Left wrist: Tenderness present. No swelling, bony tenderness, snuff box tenderness or crepitus. Normal range of motion. Normal pulse.      Left hand: Normal.      Cervical back: " Full passive range of motion without pain.   Neurological:      Mental Status: She is alert and oriented to person, place, and time.   Psychiatric:         Mood and Affect: Mood normal.         Thought Content: Thought content normal.       Left wrist: No gross deformities, skin without edema, no edema, no ecchymosis.  No snuffbox tenderness.  Tenderness palpation to the medial aspect. +AROM with discomfort with flexion and extension.   strength 5 out of 5, sensation intact distally, neurovascular intact.  Fingernail intact with ecchymosis noted under nail polish.  Minimal tenderness to palpation.   Assessment/Plan:   1. Strain of left wrist, subsequent encounter    2. Contusion of left middle finger with damage to nail, subsequent encounter  Encourage patient to continue with rest, ice, Tyle Motrin as needed for pain, continue wearing wrist brace.  Patient will be on temporary work restrictions Limited use of the left hand.  Follow-up in 1 week for reevaluation.  Differential diagnosis, natural history, supportive care, and indications for immediate follow-up discussed.

## 2023-09-14 NOTE — LETTER
"   [facility]  [address]  [place] [address]  Phone:  [phone] - Fax:  [phone]   [facility] Progress Report and Disability Certification  Date of Service: 9/14/2023   No Show:  No  Date / Time of Next Visit: 9/21/2023   Claim Information   Patient Name: Angela Mackey  Claim Number:     Employer: [employer]  Date of Injury: 8/28/2023     Insurer / TPA: Zhen Salguero  ID / SSN:     Occupation: PRODUCTION  Diagnosis: Diagnoses of Strain of left wrist, subsequent encounter and Contusion of left middle finger with damage to nail, subsequent encounter were pertinent to this visit.    Medical Information   Related to Industrial Injury? Yes    Subjective Complaints:  DOI: 8/28/2023  (Copied from initial visit)  Patient [place]raini-speaking  used for encounter.  \"This is a very pleasant 33-year-old female present to the clinic after sustaining a work-related injury.  The patient works at [employer]  She was using a staple gun yesterday that was connected to an air compressor.  States the gun recoiled causing her to break her acrylic nail of her left middle finger.  Also caused her to bend her left wrist back awkwardly.  Experiencing pain over the dorsal aspect of the left wrist. \"  Patient returns the urgent care for their follow-up visit having exacerbating pain.  Last visit she had improvement however back to work having to use her left wrist increasing pain.  She does wear wrist brace as needed.  Continues to have pain at the left wrist with certain motions.  Minimal pain at the fingernail.  Denies any numbness or tingling in the left hand.  Patient requesting change in work restrictions   Objective Findings: Left wrist: No gross deformities, skin without edema, no edema, no ecchymosis.  No snuffbox tenderness.  Tenderness palpation to the medial aspect. +AROM with discomfort with flexion and extension.   strength 5 out of 5, sensation intact " distally, neurovascular intact.  Fingernail intact with ecchymosis noted under nail polish.  Minimal tenderness to palpation.   Pre-Existing Condition(s):     Assessment:   Condition Worsened    Status: Additional Care Required  Permanent Disability:No    Plan:      Diagnostics:      Comments:       Disability Information   Status: Released to Restricted Duty    From:  2023  Through: 2023 Restrictions are: Temporary   Physical Restrictions   Sitting:    Standing:    Stooping:    Bending:      Squatting:    Walking:    Climbing:    Pushin hrs/day   Pullin hrs/day Other:    Reaching Above Shoulder (L):   Reaching Above Shoulder (R):       Reaching Below Shoulder (L):    Reaching Below Shoulder (R):      Not to exceed Weight Limits   Carrying(hrs):   Weight Limit(lb): < or = to 10 pounds Lifting(hrs):   Weight  Limit(lb): < or = to 10 pounds   Comments: Encourage patient to continue with rest, ice, Tyle Motrin as needed for pain, continue wearing wrist brace.  Patient will be on temporary work restrictions Limited use of the left hand.  Follow-up in 1 week for reevaluation.    Repetitive Actions   Hands: i.e. Fine Manipulations from Graspin hrs/day  Comments:Left hand   Feet: i.e. Operating Foot Controls:     Driving / Operate Machinery:     Health Care Provider’s Original or Electronic Signature  RU Graf Health Care Provider’s Original or Electronic Signature    Jerry Ramires DO MPH     Clinic Name / Location: [address] [address] Clinic Phone Number: Dept: [phone]   Appointment Time: 2:40 Pm Visit Start Time: 2:57 PM   Check-In Time:  2:40 Pm Visit Discharge Time:  3:30 PM   Original-Treating Physician or Chiropractor    Page 2-Insurer/TPA    Page 3-Employer    Page 4-Employee